# Patient Record
Sex: FEMALE | Race: BLACK OR AFRICAN AMERICAN | NOT HISPANIC OR LATINO | URBAN - METROPOLITAN AREA
[De-identification: names, ages, dates, MRNs, and addresses within clinical notes are randomized per-mention and may not be internally consistent; named-entity substitution may affect disease eponyms.]

---

## 2020-11-17 ENCOUNTER — E-VISIT (OUTPATIENT)
Dept: URGENT CARE | Facility: CLINIC | Age: 20
End: 2020-11-17
Payer: COMMERCIAL

## 2020-11-17 DIAGNOSIS — U07.1 INFECTION DUE TO 2019 NOVEL CORONAVIRUS: Primary | ICD-10-CM

## 2020-11-17 PROCEDURE — 99421 OL DIG E/M SVC 5-10 MIN: CPT | Performed by: NURSE PRACTITIONER

## 2020-11-18 NOTE — PATIENT INSTRUCTIONS
November 18, 2020    RE:  Marge Ocampo                                                                                                                                                       [id number] [address] [place] [id number]        To whom it may concern:    I evaluated Marge Ocampo on 11/18/20. Repeat testing is not indicated. 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       Sincerely,  Liz Garcia, CNP

## 2021-01-09 ENCOUNTER — HEALTH MAINTENANCE LETTER (OUTPATIENT)
Age: 21
End: 2021-01-09

## 2021-05-07 ENCOUNTER — IMMUNIZATION (OUTPATIENT)
Dept: NURSING | Facility: CLINIC | Age: 21
End: 2021-05-07
Payer: COMMERCIAL

## 2021-05-07 PROCEDURE — 0001A PR COVID VAC PFIZER DIL RECON 30 MCG/0.3 ML IM: CPT

## 2021-05-07 PROCEDURE — 91300 PR COVID VAC PFIZER DIL RECON 30 MCG/0.3 ML IM: CPT

## 2021-05-28 ENCOUNTER — IMMUNIZATION (OUTPATIENT)
Dept: NURSING | Facility: CLINIC | Age: 21
End: 2021-05-28
Attending: FAMILY MEDICINE
Payer: COMMERCIAL

## 2021-05-28 PROCEDURE — 91300 PR COVID VAC PFIZER DIL RECON 30 MCG/0.3 ML IM: CPT

## 2021-05-28 PROCEDURE — 0002A PR COVID VAC PFIZER DIL RECON 30 MCG/0.3 ML IM: CPT

## 2021-06-14 ENCOUNTER — OFFICE VISIT (OUTPATIENT)
Dept: URGENT CARE | Facility: URGENT CARE | Age: 21
End: 2021-06-14
Payer: COMMERCIAL

## 2021-06-14 ENCOUNTER — NURSE TRIAGE (OUTPATIENT)
Dept: NURSING | Facility: CLINIC | Age: 21
End: 2021-06-14

## 2021-06-14 VITALS
SYSTOLIC BLOOD PRESSURE: 122 MMHG | BODY MASS INDEX: 23.03 KG/M2 | DIASTOLIC BLOOD PRESSURE: 78 MMHG | OXYGEN SATURATION: 99 % | HEART RATE: 98 BPM | HEIGHT: 72 IN | WEIGHT: 170 LBS | TEMPERATURE: 98.6 F

## 2021-06-14 DIAGNOSIS — R00.2 PALPITATIONS: ICD-10-CM

## 2021-06-14 DIAGNOSIS — R42 DIZZINESS: Primary | ICD-10-CM

## 2021-06-14 DIAGNOSIS — D72.829 LEUKOCYTOSIS, UNSPECIFIED TYPE: ICD-10-CM

## 2021-06-14 LAB
BASOPHILS # BLD AUTO: 0 10E9/L (ref 0–0.2)
BASOPHILS NFR BLD AUTO: 0.2 %
DIFFERENTIAL METHOD BLD: ABNORMAL
EOSINOPHIL # BLD AUTO: 0 10E9/L (ref 0–0.7)
EOSINOPHIL NFR BLD AUTO: 0.1 %
ERYTHROCYTE [DISTWIDTH] IN BLOOD BY AUTOMATED COUNT: 12.8 % (ref 10–15)
HCG UR QL: NEGATIVE
HCT VFR BLD AUTO: 37.1 % (ref 35–47)
HGB BLD-MCNC: 12.5 G/DL (ref 11.7–15.7)
LYMPHOCYTES # BLD AUTO: 1.5 10E9/L (ref 0.8–5.3)
LYMPHOCYTES NFR BLD AUTO: 13 %
MCH RBC QN AUTO: 29.5 PG (ref 26.5–33)
MCHC RBC AUTO-ENTMCNC: 33.7 G/DL (ref 31.5–36.5)
MCV RBC AUTO: 88 FL (ref 78–100)
MONOCYTES # BLD AUTO: 0.7 10E9/L (ref 0–1.3)
MONOCYTES NFR BLD AUTO: 5.6 %
NEUTROPHILS # BLD AUTO: 9.4 10E9/L (ref 1.6–8.3)
NEUTROPHILS NFR BLD AUTO: 81.1 %
PLATELET # BLD AUTO: 321 10E9/L (ref 150–450)
RBC # BLD AUTO: 4.24 10E12/L (ref 3.8–5.2)
WBC # BLD AUTO: 11.6 10E9/L (ref 4–11)

## 2021-06-14 PROCEDURE — 81025 URINE PREGNANCY TEST: CPT | Performed by: PHYSICIAN ASSISTANT

## 2021-06-14 PROCEDURE — 93000 ELECTROCARDIOGRAM COMPLETE: CPT | Performed by: PHYSICIAN ASSISTANT

## 2021-06-14 PROCEDURE — 85025 COMPLETE CBC W/AUTO DIFF WBC: CPT | Performed by: PHYSICIAN ASSISTANT

## 2021-06-14 PROCEDURE — 99204 OFFICE O/P NEW MOD 45 MIN: CPT | Performed by: PHYSICIAN ASSISTANT

## 2021-06-14 PROCEDURE — 36415 COLL VENOUS BLD VENIPUNCTURE: CPT | Performed by: PHYSICIAN ASSISTANT

## 2021-06-14 RX ORDER — COPPER 313.4 MG/1
1 INTRAUTERINE DEVICE INTRAUTERINE
COMMUNITY
Start: 2016-10-20 | End: 2026-10-18

## 2021-06-14 ASSESSMENT — ENCOUNTER SYMPTOMS
EYES NEGATIVE: 1
ARTHRALGIAS: 0
FEVER: 0
NAUSEA: 1
PALPITATIONS: 1
MYALGIAS: 0
SHORTNESS OF BREATH: 0
LIGHT-HEADEDNESS: 1

## 2021-06-14 ASSESSMENT — MIFFLIN-ST. JEOR: SCORE: 1643.11

## 2021-06-14 NOTE — TELEPHONE ENCOUNTER
Pt reports lightheadedness, nausea, and fatigue x 2 weeks.  Symptoms get worse when trying to exercise.  She reports a family history of heart disease diagnosed at a young age.      Disposition:  See a provider within 24 hours.  Call back with any new/worsening symptoms.  Patient verbalized understanding and had no further questions.      COVID 19 Nurse Triage Plan/Patient Instructions    Please be aware that novel coronavirus (COVID-19) may be circulating in the community. If you develop symptoms such as fever, cough, or SOB or if you have concerns about the presence of another infection including coronavirus (COVID-19), please contact your health care provider or visit [email or web address].     Disposition/Instructions    In-Person Visit with provider recommended. Reference Visit Selection Guide.    Thank you for taking steps to prevent the spread of this virus.  o Limit your contact with others.  o Wear a simple mask to cover your cough.  o Wash your hands well and often.    Resources    [facility]: About COVID-19: [email or web address]    CDC: What to Do If You're Sick: www.cdc.gov/coronavirus/2019-ncov/about/steps-when-sick.html    CDC: Ending Home Isolation: www.cdc.gov/coronavirus/2019-ncov/hcp/disposition-in-home-patients.html     CDC: Caring for Someone: www.cdc.gov/coronavirus/2019-ncov/if-you-are-sick/care-for-someone.html     [facility]: Interim Guidance for Hospital Discharge to Home: www.health.[facility].mn.us/diseases/coronavirus/hcp/hospdischarge.pdf    [facility] clinical trials (COVID-19 research studies): [email or web address]     Below are the COVID-19 hotlines at the [facility] ([facility]). Interpreters are available.   o For health questions: Call [phone] or 1-878.815.2275 (7 a.m. to 7 p.m.)  o For questions about schools and childcare: Call [phone] or 1-403.900.3975 (7 a.m. to 7 p.m.)         Brissa Elizabeth  "RN/FNA            Reason for Disposition    [1] MODERATE dizziness (e.g., interferes with normal activities) AND [2] has NOT been evaluated by physician for this  (Exception: dizziness caused by heat exposure, sudden standing, or poor fluid intake)    Additional Information    Negative: Severe difficulty breathing (e.g., struggling for each breath, speaks in single words)    Negative: [1] Difficulty breathing or swallowing AND [2] started suddenly after medicine, an allergic food or bee sting    Negative: Shock suspected (e.g., cold/pale/clammy skin, too weak to stand, low BP, rapid pulse)    Negative: Difficult to awaken or acting confused (e.g., disoriented, slurred speech)    Negative: [1] Numbness (i.e., loss of sensation) of the face, arm or leg on one side of the body AND [2] sudden onset AND [3] present now    Negative: [1] Loss of speech or garbled speech AND [2] sudden onset AND [3] present now    Negative: Overdose (accidental or intentional) of medications    Negative: [1] Fainted > 15 minutes ago AND [2] still feels too weak or dizzy to stand    Negative: [1] Weakness (i.e., paralysis, loss of muscle strength) of the face, arm or leg on one side of the body AND [2] sudden onset AND [3] present now    Negative: Sounds like a life-threatening emergency to the triager    Negative: Heart beating < 50 beats per minute OR > 140 beats per minute    Negative: Difficulty breathing    Negative: SEVERE dizziness (e.g., unable to stand, requires support to walk, feels like passing out now)    Negative: Extra heart beats OR irregular heart beating  (i.e., \"palpitations\")    Negative: [1] Drinking very little AND [2] dehydration suspected (e.g., no urine > 12 hours, very dry mouth, very lightheaded)    Negative: Patient sounds very sick or weak to the triager    Negative: [1] Dizziness caused by heat exposure, sudden standing, or poor fluid intake AND [2] no improvement after 2 hours of rest and fluids    Negative: " [1] Fever > 103 F (39.4 C) AND [2] not able to get the fever down using Fever Care Advice    Negative: [1] Fever > 101 F (38.3 C) AND [2] age > 60    Negative: [1] Fever > 100.0 F (37.8 C) AND [2] bedridden (e.g., nursing home patient, CVA, chronic illness, recovering from surgery)    Negative: [1] Fever > 100.0 F (37.8 C) AND [2] diabetes mellitus or weak immune system (e.g., HIV positive, cancer chemo, splenectomy, organ transplant, chronic steroids)    Protocols used: DIZZINESS - [id number]

## 2021-06-14 NOTE — PATIENT INSTRUCTIONS
Patient Education     Understanding Heart Palpitations    Heart palpitations are the feeling you have when your heartbeat seems to be racing, pounding, skipping, or fluttering. Heart palpitations are most often felt in the chest. Sometimes, they may also be felt in the neck.  What causes heart palpitations?  In most cases, heart palpitations are caused by:    Stress or anxiety    Exercise    Pregnancy    Some medicines    Caffeine    Nicotine    Alcohol    Illegal drugs, such as cocaine    Health problems, such as anemia or overactive thyroid  Many heart palpitations are harmless. But in some cases, palpitations may be caused by a problem with the heart such as an abnormal heart rhythm (arrhythmia). They may need to be managed by you and your healthcare provider or treated right away.  How are heart palpitations treated?  Treatments for heart palpitations depend on the cause. Options may include:    Managing the things that trigger your heart palpitations. This could mean:  ? Learning ways to reduce stress and anxiety  ? Staying away from caffeine, nicotine, alcohol, and illegal drugs  ? Stopping the use of certain medicines, under your doctor s guidance    Medicines, procedures, or surgery to treat an arrhythmia or other health problem that is causing your symptoms  What are possible complications of heart palpitations?  Complications of heart palpitations are rare unless they are caused by a problem such as an arrhythmia. In such cases, complications can include:    Fainting    Heart failure. This problem occurs when the heart is so weak it no longer pumps blood well.    Blood clots and stroke    Sudden cardiac arrest. This problem occurs when the heart suddenly stops beating.  When should I call my healthcare provider?  Call your healthcare provider right away if you have any of these:    Palpitations that prevent you from sleeping or otherwise affect your quality of life.    Symptoms that don t get better with  treatment, or symptoms that get worse    New symptoms, such as chest pain, shortness of breath, dizziness, or fainting  [employer] last reviewed this educational content on 6/1/2019 2000-2021 [employer]. All rights reserved. This information is not intended as a substitute for professional medical care. Always follow your healthcare professional's instructions.           Patient Education     Possible Causes of Dizziness or Fainting    Dizziness and fainting can have many causes. Below are some examples of possible causes your healthcare provider will look to rule out.   Benign paroxysmal positional vertigo (BPPV)  BPPV results when calcium crystals inside the inner ear shift into the wrong position. BPPV causes episodes of vertigo, a spinning sensation. Episodes most often happen when the head is moved in a certain way. This is more common in people age 65 and older.    Infection or inflammation  The semicircular canals of the ear may become infected or inflamed. In this case, they can send the wrong balance signals. This can cause vertigo.   Meniere disease  Meniere disease happens when there is too much fluid in the semicircular canals. This can cause vertigo. It also can cause hearing problems and buzzing or ringing in the ears (called tinnitus). You may also have a feeling of pressure or fullness in the ear.   Syncope  Syncope is fainting that happens when the brain doesn t get enough oxygen-rich blood. It can be caused by low heart rate or low blood pressure. This is called vasovagal syncope. It can also be caused by sitting or standing up too quickly. This is called orthostatic hypotension. Syncope may also be due to a heart valve problem, an abnormal heart rhythm, or other heart problems. Dizziness can also happen from stroke, hemorrhage in the brain, or other problems in the brain. Your healthcare provider may do certain tests to rule out these conditions.   Other causes  Other causes  include:    Medicines. Certain medicines can cause dizziness and even fainting. In some cases, stopping a medicine too quickly can lead to withdrawal symptoms, including dizziness and fainting.    Anxiety. Being anxious can lead to breathing changes, such as hyperventilation. These can lead to dizziness and fainting.  Additional causes for dizziness and fainting also exist. Talk with your healthcare provider for more information.      [email or web address] last reviewed this educational content on 5/1/2018 2000-2021 [employer]. All rights reserved. This information is not intended as a substitute for professional medical care. Always follow your healthcare professional's instructions.           Patient Education     Understanding Blood and Blood Components  Blood is a fluid that flows throughout the body in blood vessels. Blood is needed for life. Blood carries oxygen and nutrients to your organs and tissues and helps remove waste. Blood also helps you fight infections and heal from injuries. This sheet tells you more about blood and its important role in your body.           Blood cells are carried in plasma through the body s blood vessels.          Blood cells are made in bone marrow, located in the center of bones.      What are the components of blood?  Blood can be broken down into different parts (components). These components include red blood cells, white blood cells, platelets, and plasma.    Red blood cells (RBCs) carry oxygen to the body. Each RBC lives for about 4 months. RBCs contain a protein called hemoglobin. Hemoglobin allows RBCs to  oxygen from the lungs. Iron is needed to make hemoglobin.    White blood cells (WBCs) are part of the body s immune system. WBCs help fight infections and diseases. There are different types of WBCs. These include neutrophils, lymphocytes, monoctyes, eosinophils, and basophils. WBCs live for hours, days, months, or years depending on the specific  type.    Platelets are cells that help with clotting. When you have a cut or bruise, platelets come together to form a clot or  plug.  This helps to control bleeding, so your body doesn t lose too much blood. Platelets live in the body for about 7 to 10 days.    Plasma is the liquid portion of blood. It carries the different types of blood cells to all the parts of the body. Plasma also carries proteins including clotting factors. Clotting factors help platelets with the clotting process.  Where is blood made in the body?  Blood and plasma are made in the following ways:     Blood cells are made in the bone marrow. The bone marrow is the soft, spongy part inside bones. New blood cells are made daily. They help replace the cells that die naturally or through injury or illness.    Plasma is made up mostly of water. Plasma is also made up of different proteins, fatty substances, salt, nutrients, vitamins, and hormones.  [employer] last reviewed this educational content on 5/1/2018 2000-2021 [employer]. All rights reserved. This information is not intended as a substitute for professional medical care. Always follow your healthcare professional's instructions.

## 2021-06-14 NOTE — PROGRESS NOTES
SUBJECTIVE:   Marge Ocampo is a 20 year old female presenting with a chief complaint of   Chief Complaint   Patient presents with     Urgent Care     Pt in clinic c/o palpatations, dizziness, feelings of  syncope, and SOB while exercising.     Palpitations     Dizziness     Syncope     Respiratory Problems       She is an established patient of [facility].  Patient presents with complaints of nausea, lightheadedness and palpitations with exercising.  Symptoms last 10-15 minutes.  Mom with CHF during pregnancy at age 38.  Same symptoms without exercise.  Sometimes a headache.   States feels SOB when palpitations occur.      PMHx:  None  Allergies:  None  Surgeries:  None  Medications:  None  Social:  None, drinks 1 cup of coffee/day, one energy drink/day.      Review of Systems   Constitutional: Negative for fever.   HENT: Negative.  Negative for ear pain.    Eyes: Negative.    Respiratory: Negative for shortness of breath.    Cardiovascular: Positive for palpitations. Negative for chest pain.   Gastrointestinal: Positive for nausea.   Musculoskeletal: Negative for arthralgias and myalgias.   Skin: Negative for rash.   Neurological: Positive for light-headedness.   All other systems reviewed and are negative.      No past medical history on file.  No family history on file.  Current Outpatient Medications   Medication Sig Dispense Refill     paragard intrauterine copper device 1 Intra Uterine Device by Intrauterine route       Social History     Tobacco Use     Smoking status: Never Smoker     Smokeless tobacco: Never Used   Substance Use Topics     Alcohol use: Not on file       OBJECTIVE  /78   Pulse 98   Temp 98.6  F (37  C) (Tympanic)   Ht 1.829 m (6')   Wt 77.1 kg (170 lb)   SpO2 99%   BMI 23.06 kg/m      Physical Exam    Labs:  Results for orders placed or performed in visit on 06/14/21 (from the past 24 hour(s))   CBC with platelets and differential   Result Value Ref Range    WBC 11.6 (H) 4.0  - 11.0 10e9/L    RBC Count 4.24 3.8 - 5.2 10e12/L    Hemoglobin 12.5 11.7 - 15.7 g/dL    Hematocrit 37.1 35.0 - 47.0 %    MCV 88 78 - 100 fl    MCH 29.5 26.5 - 33.0 pg    MCHC 33.7 31.5 - 36.5 g/dL    RDW 12.8 10.0 - 15.0 %    Platelet Count 321 150 - 450 10e9/L    % Neutrophils 81.1 %    % Lymphocytes 13.0 %    % Monocytes 5.6 %    % Eosinophils 0.1 %    % Basophils 0.2 %    Absolute Neutrophil 9.4 (H) 1.6 - 8.3 10e9/L    Absolute Lymphocytes 1.5 0.8 - 5.3 10e9/L    Absolute Monocytes 0.7 0.0 - 1.3 10e9/L    Absolute Eosinophils 0.0 0.0 - 0.7 10e9/L    Absolute Basophils 0.0 0.0 - 0.2 10e9/L    Diff Method Automated Method    HCG Qual, Urine ([id number])   Result Value Ref Range    HCG Qual Urine Negative NEG^Negative       X-Ray was not done.    EKG:  NSR, HR 87    ASSESSMENT:      ICD-10-CM    1. Dizziness  R42 CBC with platelets and differential     HCG Qual, Urine ([id number])   2. Palpitations  R00.2 EKG 12-lead complete w/read - Clinics     CARDIOLOGY EVAL ADULT REFERRAL   3. Leukocytosis, unspecified type  D72.829         Medical Decision Making:    Differential Diagnosis:  Tachycardia, anxiety, pregnancy    Serious Comorbid Conditions:  Adult:  None    PLAN:    Referral to cardiology.  Discussed reasons to seek immediate medical attention.    Patient education.    Repeat CBC in a week or see PCP.      Followup:    If not improving or if condition worsens, follow up with your Primary Care Provider, In 7  day(s) follow up with  Cardiology    Patient Instructions       Patient Education     Understanding Heart Palpitations    Heart palpitations are the feeling you have when your heartbeat seems to be racing, pounding, skipping, or fluttering. Heart palpitations are most often felt in the chest. Sometimes, they may also be felt in the neck.  What causes heart palpitations?  In most cases, heart palpitations are caused by:    Stress or anxiety    Exercise    Pregnancy    Some  medicines    Caffeine    Nicotine    Alcohol    Illegal drugs, such as cocaine    Health problems, such as anemia or overactive thyroid  Many heart palpitations are harmless. But in some cases, palpitations may be caused by a problem with the heart such as an abnormal heart rhythm (arrhythmia). They may need to be managed by you and your healthcare provider or treated right away.  How are heart palpitations treated?  Treatments for heart palpitations depend on the cause. Options may include:    Managing the things that trigger your heart palpitations. This could mean:  ? Learning ways to reduce stress and anxiety  ? Staying away from caffeine, nicotine, alcohol, and illegal drugs  ? Stopping the use of certain medicines, under your doctor s guidance    Medicines, procedures, or surgery to treat an arrhythmia or other health problem that is causing your symptoms  What are possible complications of heart palpitations?  Complications of heart palpitations are rare unless they are caused by a problem such as an arrhythmia. In such cases, complications can include:    Fainting    Heart failure. This problem occurs when the heart is so weak it no longer pumps blood well.    Blood clots and stroke    Sudden cardiac arrest. This problem occurs when the heart suddenly stops beating.  When should I call my healthcare provider?  Call your healthcare provider right away if you have any of these:    Palpitations that prevent you from sleeping or otherwise affect your quality of life.    Symptoms that don t get better with treatment, or symptoms that get worse    New symptoms, such as chest pain, shortness of breath, dizziness, or fainting  Jie last reviewed this educational content on 6/1/2019 2000-2021 [employer]. All rights reserved. This information is not intended as a substitute for professional medical care. Always follow your healthcare professional's instructions.           Patient Education      Possible Causes of Dizziness or Fainting    Dizziness and fainting can have many causes. Below are some examples of possible causes your healthcare provider will look to rule out.   Benign paroxysmal positional vertigo (BPPV)  BPPV results when calcium crystals inside the inner ear shift into the wrong position. BPPV causes episodes of vertigo, a spinning sensation. Episodes most often happen when the head is moved in a certain way. This is more common in people age 65 and older.    Infection or inflammation  The semicircular canals of the ear may become infected or inflamed. In this case, they can send the wrong balance signals. This can cause vertigo.   Meniere disease  Meniere disease happens when there is too much fluid in the semicircular canals. This can cause vertigo. It also can cause hearing problems and buzzing or ringing in the ears (called tinnitus). You may also have a feeling of pressure or fullness in the ear.   Syncope  Syncope is fainting that happens when the brain doesn t get enough oxygen-rich blood. It can be caused by low heart rate or low blood pressure. This is called vasovagal syncope. It can also be caused by sitting or standing up too quickly. This is called orthostatic hypotension. Syncope may also be due to a heart valve problem, an abnormal heart rhythm, or other heart problems. Dizziness can also happen from stroke, hemorrhage in the brain, or other problems in the brain. Your healthcare provider may do certain tests to rule out these conditions.   Other causes  Other causes include:    Medicines. Certain medicines can cause dizziness and even fainting. In some cases, stopping a medicine too quickly can lead to withdrawal symptoms, including dizziness and fainting.    Anxiety. Being anxious can lead to breathing changes, such as hyperventilation. These can lead to dizziness and fainting.  Additional causes for dizziness and fainting also exist. Talk with your healthcare provider  for more information.      [employer] last reviewed this educational content on 5/1/2018 2000-2021 [employer]. All rights reserved. This information is not intended as a substitute for professional medical care. Always follow your healthcare professional's instructions.           Patient Education     Understanding Blood and Blood Components  Blood is a fluid that flows throughout the body in blood vessels. Blood is needed for life. Blood carries oxygen and nutrients to your organs and tissues and helps remove waste. Blood also helps you fight infections and heal from injuries. This sheet tells you more about blood and its important role in your body.           Blood cells are carried in plasma through the body s blood vessels.          Blood cells are made in bone marrow, located in the center of bones.      What are the components of blood?  Blood can be broken down into different parts (components). These components include red blood cells, white blood cells, platelets, and plasma.    Red blood cells (RBCs) carry oxygen to the body. Each RBC lives for about 4 months. RBCs contain a protein called hemoglobin. Hemoglobin allows RBCs to  oxygen from the lungs. Iron is needed to make hemoglobin.    White blood cells (WBCs) are part of the body s immune system. WBCs help fight infections and diseases. There are different types of WBCs. These include neutrophils, lymphocytes, monoctyes, eosinophils, and basophils. WBCs live for hours, days, months, or years depending on the specific type.    Platelets are cells that help with clotting. When you have a cut or bruise, platelets come together to form a clot or  plug.  This helps to control bleeding, so your body doesn t lose too much blood. Platelets live in the body for about 7 to 10 days.    Plasma is the liquid portion of blood. It carries the different types of blood cells to all the parts of the body. Plasma also carries proteins including  clotting factors. Clotting factors help platelets with the clotting process.  Where is blood made in the body?  Blood and plasma are made in the following ways:     Blood cells are made in the bone marrow. The bone marrow is the soft, spongy part inside bones. New blood cells are made daily. They help replace the cells that die naturally or through injury or illness.    Plasma is made up mostly of water. Plasma is also made up of different proteins, fatty substances, salt, nutrients, vitamins, and hormones.  [employer] last reviewed this educational content on 5/1/2018 2000-2021 [employer]. All rights reserved. This information is not intended as a substitute for professional medical care. Always follow your healthcare professional's instructions.

## 2021-06-22 ENCOUNTER — VIRTUAL VISIT (OUTPATIENT)
Dept: CARDIOLOGY | Facility: CLINIC | Age: 21
End: 2021-06-22
Attending: PHYSICIAN ASSISTANT
Payer: COMMERCIAL

## 2021-06-22 DIAGNOSIS — R42 DIZZINESS: ICD-10-CM

## 2021-06-22 DIAGNOSIS — R07.89 ATYPICAL CHEST PAIN: ICD-10-CM

## 2021-06-22 DIAGNOSIS — R06.02 SHORTNESS OF BREATH: ICD-10-CM

## 2021-06-22 DIAGNOSIS — R00.2 PALPITATIONS: Primary | ICD-10-CM

## 2021-06-22 PROCEDURE — 99203 OFFICE O/P NEW LOW 30 MIN: CPT | Mod: GT | Performed by: INTERNAL MEDICINE

## 2021-06-22 NOTE — LETTER
"6/22/2021      RE: Marge Ocampo  [address]  [facility] [address]       Dear Colleague,    Thank you for the opportunity to participate in the care of your patient, Marge Ocampo, at the [facility] HEART [facility]. Please see a copy of my visit note below.    Marge is a 20 year old who is being evaluated via a billable video visit.      How would you like to obtain your AVS? MyChart  If the video visit is dropped, the invitation should be resent by: Send to e-mail at: [email or web address]  Will anyone else be joining your video visit? No      Vitals - Patient Reported  Weight (Patient Reported): 77.1 kg (170 lb)  Height (Patient Reported): 184.2 cm (6' 0.5\")  BMI (Based on Pt Reported Ht/Wt): 22.74  Pain Score: No Pain (0)  Pain Loc: Chest        Subjective   Marge is a 20 year old who presents for evaluation of palpitations, dizziness, shortness of breath and exercise intolerance and chest pain.    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  She exercises regularly.  Do high intensity training.  Lately patient noticed inappropriate tachycardia during exercise.  Also dizziness and shortness of breath.  She also feeling dizzy in the morning.  Mainly when she is standing up or assuming upright position suddenly.  No associated palpitation.  Never lost consciousness.  She feels her heart is beating fast.  This is not associated with any symptoms.  Her symptoms are of recent onset and nonprogressive.  Patient has no significant risk factors.  No excess caffeine or alcohol.  No drugs.  She is taking a protein mix during the exercise.  History of mother having heart failure during fourth pregnancy.  Subsequently she had another pregnancy and delivery.  Currently not on continuous medications.  Patient is a non-smoker.  No significant other family history apart from " "heart failure.    Review of Systems   Constitutional, HEENT, cardiovascular, pulmonary, gi and gu systems are negative, except as otherwise noted.      Objective    Vitals - Patient Reported  Weight (Patient Reported): 77.1 kg (170 lb)  Height (Patient Reported): 184.2 cm (6' 0.5\")  BMI (Based on Pt Reported Ht/Wt): 22.74  Pain Score: No Pain (0)  Pain Loc: Chest        Physical Exam   GENERAL: Healthy, alert and no distress  EYES: Eyes grossly normal to inspection.  No discharge or erythema, or obvious scleral/conjunctival abnormalities.  RESP: No audible wheeze, cough, or visible cyanosis.  No visible retractions or increased work of breathing.    SKIN: Visible skin clear. No significant rash, abnormal pigmentation or lesions.  NEURO: Cranial nerves grossly intact.  Mentation and speech appropriate for age.  PSYCH: Mentation appears normal, affect normal/bright, judgement and insight intact, normal speech and appearance well-groomed.    ASSESSMENT/PLAN:  Patient here for evaluation of multiple cardiac symptoms.  Palpitations, dizziness, shortness of breath, inappropriate tachycardia during exercise and exercise intolerance.  Patient does high intensity training.  Lately noted all the symptoms during exercise.  Also have similar symptoms in the morning.  No history of sustained arrhythmia, no loss of consciousness.  No significant family history of arrhythmia or sudden cardiac death.  She works as a  at [employer] and also an nursing student.  No excess coffee, alcohol or drugs.  She is taking a protein supplement which she bought online.  Reviewed EKG done recently.  Normal sinus rhythm normal EKG.  Patient had an echocardiogram in outside hospital 2016 which was completely normal.  For some reason she was advised to have yearly echocardiogram during her teenage.  Discussed with patient that most of the time palpitations can be benign and at times significant and " dangerous.  Discussed some of the supplements and cardiac arrhythmia.  Patient is advised to discontinue the protein makes and see whether this makes any difference in her symptoms.  Meanwhile we will plan for a 48-hour Holter and also an exercise stress echocardiogram to assess structure function, exertional symptoms, blood pressure response, arrhythmia during stress.  We will also check a basic metabolic panel as patient is concerned about hypokalemia.  Patient will be contacted with the test results.  Meanwhile she is advised to continue her physical activity back to avoid extreme exertion.    Video-Visit Details    Type of service:  Video Visit  Video visit start time: 11:29 AM  Video End Time:11:39 AM    Originating Location (pt. Location): Home    Distant Location (provider location):  [facility]     Platform used for Video Visit: [employer]   Total visit duration 30 minutes.  This includes video interview, chart review, review of Care Everywhere, EKGs echocardiogram and documentation.        Please do not hesitate to contact me if you have any questions/concerns.     Sincerely,     MAYCO Virk MD

## 2021-06-22 NOTE — PATIENT INSTRUCTIONS
Schedule appointments for :  -- Exercise Stress Echocardiogram  -- 48 hour cardiac monitor hookup (please schedule this appointment to occur after the echocardiogram)  -- lab appointment for blood draw (BMP) this can be before or after the other two appointments, whichever you prefer    As soon as results are compiled and reviewed, you will be notified.    You may wait for our scheduling team to contact you or you may call to schedule (recommended) : [phone]    Dr. David has advised you to stop consuming the protein shakes for a bit and test your response/symptoms compared to while you are consuming them.    Avoid extreme exertion.

## 2021-06-22 NOTE — PROGRESS NOTES
"Marge is a 20 year old who is being evaluated via a billable video visit.      How would you like to obtain your AVS? MyChart  If the video visit is dropped, the invitation should be resent by: Send to e-mail at: [email or web address]  Will anyone else be joining your video visit? No      Vitals - Patient Reported  Weight (Patient Reported): 77.1 kg (170 lb)  Height (Patient Reported): 184.2 cm (6' 0.5\")  BMI (Based on Pt Reported Ht/Wt): 22.74  Pain Score: No Pain (0)  Pain Loc: Chest        Subjective   Marge is a 20 year old who presents for evaluation of palpitations, dizziness, shortness of breath and exercise intolerance and chest pain.    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  She exercises regularly.  Do high intensity training.  Lately patient noticed inappropriate tachycardia during exercise.  Also dizziness and shortness of breath.  She also feeling dizzy in the morning.  Mainly when she is standing up or assuming upright position suddenly.  No associated palpitation.  Never lost consciousness.  She feels her heart is beating fast.  This is not associated with any symptoms.  Her symptoms are of recent onset and nonprogressive.  Patient has no significant risk factors.  No excess caffeine or alcohol.  No drugs.  She is taking a protein mix during the exercise.  History of mother having heart failure during fourth pregnancy.  Subsequently she had another pregnancy and delivery.  Currently not on continuous medications.  Patient is a non-smoker.  No significant other family history apart from heart failure.    Review of Systems   Constitutional, HEENT, cardiovascular, pulmonary, gi and gu systems are negative, except as otherwise noted.      Objective    Vitals - Patient Reported  Weight (Patient Reported): 77.1 kg (170 lb)  Height (Patient Reported): 184.2 cm (6' 0.5\")  BMI (Based on Pt Reported Ht/Wt): 22.74  Pain Score: No Pain " (0)  Pain Loc: Chest        Physical Exam   GENERAL: Healthy, alert and no distress  EYES: Eyes grossly normal to inspection.  No discharge or erythema, or obvious scleral/conjunctival abnormalities.  RESP: No audible wheeze, cough, or visible cyanosis.  No visible retractions or increased work of breathing.    SKIN: Visible skin clear. No significant rash, abnormal pigmentation or lesions.  NEURO: Cranial nerves grossly intact.  Mentation and speech appropriate for age.  PSYCH: Mentation appears normal, affect normal/bright, judgement and insight intact, normal speech and appearance well-groomed.    ASSESSMENT/PLAN:  Patient here for evaluation of multiple cardiac symptoms.  Palpitations, dizziness, shortness of breath, inappropriate tachycardia during exercise and exercise intolerance.  Patient does high intensity training.  Lately noted all the symptoms during exercise.  Also have similar symptoms in the morning.  No history of sustained arrhythmia, no loss of consciousness.  No significant family history of arrhythmia or sudden cardiac death.  She works as a  at [employer] and also an nursing student.  No excess coffee, alcohol or drugs.  She is taking a protein supplement which she bought online.  Reviewed EKG done recently.  Normal sinus rhythm normal EKG.  Patient had an echocardiogram in outside hospital 2016 which was completely normal.  For some reason she was advised to have yearly echocardiogram during her teenage.  Discussed with patient that most of the time palpitations can be benign and at times significant and dangerous.  Discussed some of the supplements and cardiac arrhythmia.  Patient is advised to discontinue the protein makes and see whether this makes any difference in her symptoms.  Meanwhile we will plan for a 48-hour Holter and also an exercise stress echocardiogram to assess structure function, exertional symptoms, blood pressure response, arrhythmia during  stress.  We will also check a basic metabolic panel as patient is concerned about hypokalemia.  Patient will be contacted with the test results.  Meanwhile she is advised to continue her physical activity back to avoid extreme exertion.    Video-Visit Details    Type of service:  Video Visit  Video visit start time: 11:29 AM  Video End Time:11:39 AM    Originating Location (pt. Location): Home    Distant Location (provider location):  [facility] HE[facility]     Platform used for Video Visit: [email or web address]   Total visit duration 30 minutes.  This includes video interview, chart review, review of Care Everywhere, EKGs echocardiogram and documentation.

## 2021-07-07 ENCOUNTER — TELEPHONE (OUTPATIENT)
Dept: CARDIOLOGY | Facility: CLINIC | Age: 21
End: 2021-07-07

## 2021-07-07 NOTE — TELEPHONE ENCOUNTER
Pt needs to schedule labs, echocardiogram stress test, and a 48 hour holter monitor.    Schedule holter for AFTER stress test.    Schedule all for as soon as possible.

## 2021-10-11 ENCOUNTER — HEALTH MAINTENANCE LETTER (OUTPATIENT)
Age: 21
End: 2021-10-11

## 2022-01-30 ENCOUNTER — HEALTH MAINTENANCE LETTER (OUTPATIENT)
Age: 22
End: 2022-01-30

## 2022-04-01 ENCOUNTER — LAB REQUISITION (OUTPATIENT)
Dept: LAB | Facility: CLINIC | Age: 22
End: 2022-04-01
Payer: COMMERCIAL

## 2022-04-01 PROCEDURE — 36415 COLL VENOUS BLD VENIPUNCTURE: CPT | Performed by: INTERNAL MEDICINE

## 2022-04-01 PROCEDURE — 86706 HEP B SURFACE ANTIBODY: CPT | Performed by: INTERNAL MEDICINE

## 2022-04-03 LAB — HBV SURFACE AB SERPL IA-ACNC: 45.82 M[IU]/ML

## 2022-09-24 ENCOUNTER — HEALTH MAINTENANCE LETTER (OUTPATIENT)
Age: 22
End: 2022-09-24

## 2023-05-08 ENCOUNTER — HEALTH MAINTENANCE LETTER (OUTPATIENT)
Age: 23
End: 2023-05-08